# Patient Record
Sex: FEMALE | Race: BLACK OR AFRICAN AMERICAN | NOT HISPANIC OR LATINO | Employment: STUDENT | URBAN - METROPOLITAN AREA
[De-identification: names, ages, dates, MRNs, and addresses within clinical notes are randomized per-mention and may not be internally consistent; named-entity substitution may affect disease eponyms.]

---
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
## 2025-02-23 ENCOUNTER — HOSPITAL ENCOUNTER (EMERGENCY)
Facility: HOSPITAL | Age: 20
Discharge: HOME | End: 2025-02-23
Payer: COMMERCIAL

## 2025-02-23 VITALS
SYSTOLIC BLOOD PRESSURE: 105 MMHG | RESPIRATION RATE: 18 BRPM | DIASTOLIC BLOOD PRESSURE: 71 MMHG | HEART RATE: 71 BPM | TEMPERATURE: 98 F | OXYGEN SATURATION: 97 %

## 2025-02-23 DIAGNOSIS — A54.9 GONORRHEA: Primary | ICD-10-CM

## 2025-02-23 PROCEDURE — 99284 EMERGENCY DEPT VISIT MOD MDM: CPT | Performed by: PHYSICIAN ASSISTANT

## 2025-02-23 PROCEDURE — 96372 THER/PROPH/DIAG INJ SC/IM: CPT | Performed by: PHYSICIAN ASSISTANT

## 2025-02-23 PROCEDURE — 2500000004 HC RX 250 GENERAL PHARMACY W/ HCPCS (ALT 636 FOR OP/ED): Mod: SE | Performed by: PHYSICIAN ASSISTANT

## 2025-02-23 PROCEDURE — 99284 EMERGENCY DEPT VISIT MOD MDM: CPT

## 2025-02-23 RX ORDER — CEFTRIAXONE 500 MG/1
500 INJECTION, POWDER, FOR SOLUTION INTRAMUSCULAR; INTRAVENOUS ONCE
Status: DISCONTINUED | OUTPATIENT
Start: 2025-02-23 | End: 2025-02-23

## 2025-02-23 RX ORDER — CEFTRIAXONE 500 MG/1
500 INJECTION, POWDER, FOR SOLUTION INTRAMUSCULAR; INTRAVENOUS ONCE
Status: COMPLETED | OUTPATIENT
Start: 2025-02-23 | End: 2025-02-23

## 2025-02-23 RX ADMIN — CEFTRIAXONE SODIUM 500 MG: 500 INJECTION, POWDER, FOR SOLUTION INTRAMUSCULAR; INTRAVENOUS at 19:01

## 2025-02-23 ASSESSMENT — COLUMBIA-SUICIDE SEVERITY RATING SCALE - C-SSRS
2. HAVE YOU ACTUALLY HAD ANY THOUGHTS OF KILLING YOURSELF?: NO
1. IN THE PAST MONTH, HAVE YOU WISHED YOU WERE DEAD OR WISHED YOU COULD GO TO SLEEP AND NOT WAKE UP?: NO

## 2025-02-23 NOTE — ED TRIAGE NOTES
Patient was tested for STD at clinic. Was told to come back in for treatment. Patient states they are not open at this time so she came to the ED for treatment.

## 2025-02-23 NOTE — ED PROVIDER NOTES
Emergency Department Encounter  [facility] EMERGENCY MEDICINE    Patient: Luma Moran  MRN: [id number]  : 2005  Date of Evaluation: 2025  ED Provider: Jessica Gonzales PA-C        History of Present Illness     This is a healthy 19-year-old female who presents to the ED requesting treatment for gonorrhea.  She states that she was recently tested and tested positive for gonorrhea.  Negative for trichomonas and chlamydia.  She denies any current symptoms, severely denies abdominal pain or discharge.  She is sexually active with 1 male partner and states that he has already been treated.  She denies concern for pregnancy.  She states otherwise she is feeling well.               Visit Vitals  /71   Pulse 71   Temp 36.7 °C (98 °F) (Oral)   Resp 18   SpO2 97%   OB Status Having periods   Smoking Status Never Assessed          Physical Exam       Triage vitals:  T 36.7 °C (98 °F)  HR 71  /71  RR 18  O2 97 % None (Room air)    Physical Exam     Physical exam:   General: Vitals noted, no distress. Afebrile.   EENT:  Hearing grossly intact. Normal phonation. MMM. Airway patient. PERRL. EOMI.   Neck: No midline tenderness or paraspinal tenderness. FROM.   Cardiac: Regular, rate, rhythm. Normal S1 and S2.  No murmurs, gallops, rubs.   Pulmonary: Good air exchange. Lungs clear bilaterally. No wheezes, rhonchi, rales. No accessory muscle use.   Abdomen: Soft, nonsurgical. Nontender. No peritoneal signs.   Back: No CVA tenderness. No midline tenderness or paraspinal tenderness. No obvious deformity or step off.   Extremities: No peripheral edema.  Full range of motion. Moves all extremities freely. No tenderness throughout extremities.   Skin: No rash. Warm and Dry.   Neuro: No focal neurologic deficits. CN 2-12 grossly intact. Sensation equal bilaterally. No weakness.       Results       Labs Reviewed - No data to display    No orders to display         Medical Decision Making & ED  Course         ED Course & [facility]     Medical Decision Making  This is a 19-year-old female who presents to the ED requesting treatment for gonorrhea after having a positive test as an outpatient.  Vital stable upon arrival to the ED.  On examination she is overall well-appearing.  Abdomen soft and nontender.  No CVA tenderness.  Based on patient's benign abdominal exam I have low suspicion for PID/TOA at this time.  Outpatient records reviewed which show she had a positive gonorrhea test yesterday.  Patient ordered IM Rocephin.  She was advised to remain sexually abstinent for 1 week after both her and her partner have been treated and have no symptoms.  She was given signs and symptoms to return to the ED with and was advised about with OB/GYN.  She was discharged from ED in stable condition.         Diagnoses as of 02/23/25 1857   Gonorrhea             Disposition   As a result of the work-up, the patient was discharged home.  she was informed of her diagnosis and instructed to come back with any concerns or worsening of condition.  she and was agreeable to the plan as discussed above.  she was given the opportunity to ask questions.  All of the patient's questions were answered.    Procedures     Patient was seen independently    Procedures    Jessica Gonzales PA-C  Emergency Medicine      Jessica Gonzales PA-C  02/23/25 1934